# Patient Record
Sex: MALE | Race: WHITE | NOT HISPANIC OR LATINO | Employment: OTHER | URBAN - METROPOLITAN AREA
[De-identification: names, ages, dates, MRNs, and addresses within clinical notes are randomized per-mention and may not be internally consistent; named-entity substitution may affect disease eponyms.]

---

## 2024-06-08 ENCOUNTER — ANESTHESIA (OUTPATIENT)
Dept: SURGERY | Facility: CLINIC | Age: 74
End: 2024-06-08
Payer: COMMERCIAL

## 2024-06-08 ENCOUNTER — HOSPITAL ENCOUNTER (OUTPATIENT)
Facility: CLINIC | Age: 74
Discharge: HOME | End: 2024-06-09
Attending: EMERGENCY MEDICINE | Admitting: UROLOGY
Payer: COMMERCIAL

## 2024-06-08 ENCOUNTER — APPOINTMENT (OUTPATIENT)
Dept: GENERAL RADIOLOGY | Facility: CLINIC | Age: 74
End: 2024-06-08
Attending: UROLOGY
Payer: COMMERCIAL

## 2024-06-08 ENCOUNTER — ANESTHESIA EVENT (OUTPATIENT)
Dept: SURGERY | Facility: CLINIC | Age: 74
End: 2024-06-08
Payer: COMMERCIAL

## 2024-06-08 DIAGNOSIS — R33.9 URINARY RETENTION: ICD-10-CM

## 2024-06-08 DIAGNOSIS — N13.9 URINARY OBSTRUCTION: ICD-10-CM

## 2024-06-08 LAB
ANION GAP SERPL CALCULATED.3IONS-SCNC: 16 MMOL/L (ref 7–15)
BASOPHILS # BLD AUTO: 0.1 10E3/UL (ref 0–0.2)
BASOPHILS NFR BLD AUTO: 1 %
BUN SERPL-MCNC: 30.5 MG/DL (ref 8–23)
CALCIUM SERPL-MCNC: 9.6 MG/DL (ref 8.8–10.2)
CHLORIDE SERPL-SCNC: 105 MMOL/L (ref 98–107)
CREAT SERPL-MCNC: 1.06 MG/DL (ref 0.67–1.17)
DEPRECATED HCO3 PLAS-SCNC: 17 MMOL/L (ref 22–29)
EGFRCR SERPLBLD CKD-EPI 2021: 73 ML/MIN/1.73M2
EOSINOPHIL # BLD AUTO: 0.2 10E3/UL (ref 0–0.7)
EOSINOPHIL NFR BLD AUTO: 2 %
ERYTHROCYTE [DISTWIDTH] IN BLOOD BY AUTOMATED COUNT: 13.2 % (ref 10–15)
GLUCOSE BLDC GLUCOMTR-MCNC: 209 MG/DL (ref 70–99)
GLUCOSE SERPL-MCNC: 309 MG/DL (ref 70–99)
HCT VFR BLD AUTO: 37.1 % (ref 40–53)
HGB BLD-MCNC: 12.5 G/DL (ref 13.3–17.7)
HOLD SPECIMEN: NORMAL
HOLD SPECIMEN: NORMAL
IMM GRANULOCYTES # BLD: 0.1 10E3/UL
IMM GRANULOCYTES NFR BLD: 1 %
LYMPHOCYTES # BLD AUTO: 1.9 10E3/UL (ref 0.8–5.3)
LYMPHOCYTES NFR BLD AUTO: 19 %
MCH RBC QN AUTO: 29.7 PG (ref 26.5–33)
MCHC RBC AUTO-ENTMCNC: 33.7 G/DL (ref 31.5–36.5)
MCV RBC AUTO: 88 FL (ref 78–100)
MONOCYTES # BLD AUTO: 0.5 10E3/UL (ref 0–1.3)
MONOCYTES NFR BLD AUTO: 5 %
NEUTROPHILS # BLD AUTO: 7.5 10E3/UL (ref 1.6–8.3)
NEUTROPHILS NFR BLD AUTO: 72 %
NRBC # BLD AUTO: 0 10E3/UL
NRBC BLD AUTO-RTO: 0 /100
PLATELET # BLD AUTO: 285 10E3/UL (ref 150–450)
POTASSIUM SERPL-SCNC: 4.9 MMOL/L (ref 3.4–5.3)
RBC # BLD AUTO: 4.21 10E6/UL (ref 4.4–5.9)
SODIUM SERPL-SCNC: 138 MMOL/L (ref 135–145)
WBC # BLD AUTO: 10.3 10E3/UL (ref 4–11)

## 2024-06-08 PROCEDURE — 250N000011 HC RX IP 250 OP 636: Mod: JZ | Performed by: EMERGENCY MEDICINE

## 2024-06-08 PROCEDURE — 96374 THER/PROPH/DIAG INJ IV PUSH: CPT

## 2024-06-08 PROCEDURE — C1769 GUIDE WIRE: HCPCS | Performed by: UROLOGY

## 2024-06-08 PROCEDURE — 370N000017 HC ANESTHESIA TECHNICAL FEE, PER MIN: Performed by: UROLOGY

## 2024-06-08 PROCEDURE — 96361 HYDRATE IV INFUSION ADD-ON: CPT | Mod: 59

## 2024-06-08 PROCEDURE — 52000 CYSTOURETHROSCOPY: CPT | Mod: GC | Performed by: UROLOGY

## 2024-06-08 PROCEDURE — C1758 CATHETER, URETERAL: HCPCS | Performed by: UROLOGY

## 2024-06-08 PROCEDURE — 999N000180 XR SURGERY CARM FLUORO LESS THAN 5 MIN: Mod: TC

## 2024-06-08 PROCEDURE — 99285 EMERGENCY DEPT VISIT HI MDM: CPT | Mod: 25

## 2024-06-08 PROCEDURE — 250N000009 HC RX 250: Performed by: EMERGENCY MEDICINE

## 2024-06-08 PROCEDURE — 36415 COLL VENOUS BLD VENIPUNCTURE: CPT | Performed by: EMERGENCY MEDICINE

## 2024-06-08 PROCEDURE — 85049 AUTOMATED PLATELET COUNT: CPT | Performed by: EMERGENCY MEDICINE

## 2024-06-08 PROCEDURE — 250N000009 HC RX 250: Performed by: NURSE ANESTHETIST, CERTIFIED REGISTERED

## 2024-06-08 PROCEDURE — 258N000003 HC RX IP 258 OP 636: Mod: JZ | Performed by: ANESTHESIOLOGY

## 2024-06-08 PROCEDURE — 710N000009 HC RECOVERY PHASE 1, LEVEL 1, PER MIN: Performed by: UROLOGY

## 2024-06-08 PROCEDURE — 258N000003 HC RX IP 258 OP 636: Mod: JZ | Performed by: EMERGENCY MEDICINE

## 2024-06-08 PROCEDURE — 255N000002 HC RX 255 OP 636: Mod: JZ | Performed by: UROLOGY

## 2024-06-08 PROCEDURE — 250N000025 HC SEVOFLURANE, PER MIN: Performed by: UROLOGY

## 2024-06-08 PROCEDURE — 74430 CONTRAST X-RAY BLADDER: CPT | Mod: 26 | Performed by: UROLOGY

## 2024-06-08 PROCEDURE — 272N000001 HC OR GENERAL SUPPLY STERILE: Performed by: UROLOGY

## 2024-06-08 PROCEDURE — 360N000075 HC SURGERY LEVEL 2, PER MIN: Performed by: UROLOGY

## 2024-06-08 PROCEDURE — 710N000012 HC RECOVERY PHASE 2, PER MINUTE: Performed by: UROLOGY

## 2024-06-08 PROCEDURE — 80048 BASIC METABOLIC PNL TOTAL CA: CPT | Performed by: EMERGENCY MEDICINE

## 2024-06-08 PROCEDURE — 250N000011 HC RX IP 250 OP 636: Mod: JZ | Performed by: NURSE ANESTHETIST, CERTIFIED REGISTERED

## 2024-06-08 PROCEDURE — 250N000009 HC RX 250: Performed by: UROLOGY

## 2024-06-08 PROCEDURE — 51798 US URINE CAPACITY MEASURE: CPT

## 2024-06-08 PROCEDURE — 999N000141 HC STATISTIC PRE-PROCEDURE NURSING ASSESSMENT: Performed by: UROLOGY

## 2024-06-08 PROCEDURE — 250N000011 HC RX IP 250 OP 636: Performed by: STUDENT IN AN ORGANIZED HEALTH CARE EDUCATION/TRAINING PROGRAM

## 2024-06-08 RX ORDER — HYDROMORPHONE HCL IN WATER/PF 6 MG/30 ML
0.4 PATIENT CONTROLLED ANALGESIA SYRINGE INTRAVENOUS EVERY 5 MIN PRN
Status: DISCONTINUED | OUTPATIENT
Start: 2024-06-08 | End: 2024-06-08 | Stop reason: HOSPADM

## 2024-06-08 RX ORDER — CEFAZOLIN SODIUM/WATER 2 G/20 ML
2 SYRINGE (ML) INTRAVENOUS SEE ADMIN INSTRUCTIONS
Status: DISCONTINUED | OUTPATIENT
Start: 2024-06-08 | End: 2024-06-08 | Stop reason: HOSPADM

## 2024-06-08 RX ORDER — ATORVASTATIN CALCIUM 40 MG/1
40 TABLET, FILM COATED ORAL DAILY
COMMUNITY

## 2024-06-08 RX ORDER — ONDANSETRON 2 MG/ML
INJECTION INTRAMUSCULAR; INTRAVENOUS PRN
Status: DISCONTINUED | OUTPATIENT
Start: 2024-06-08 | End: 2024-06-08

## 2024-06-08 RX ORDER — LIDOCAINE HYDROCHLORIDE 20 MG/ML
6 JELLY TOPICAL ONCE
Status: DISCONTINUED | OUTPATIENT
Start: 2024-06-08 | End: 2024-06-09 | Stop reason: HOSPADM

## 2024-06-08 RX ORDER — HYDROMORPHONE HYDROCHLORIDE 1 MG/ML
1 INJECTION, SOLUTION INTRAMUSCULAR; INTRAVENOUS; SUBCUTANEOUS ONCE
Status: COMPLETED | OUTPATIENT
Start: 2024-06-08 | End: 2024-06-08

## 2024-06-08 RX ORDER — DIPHENHYDRAMINE HCL 12.5MG/5ML
12.5 LIQUID (ML) ORAL EVERY 6 HOURS PRN
Status: DISCONTINUED | OUTPATIENT
Start: 2024-06-08 | End: 2024-06-08 | Stop reason: HOSPADM

## 2024-06-08 RX ORDER — PROPOFOL 10 MG/ML
INJECTION, EMULSION INTRAVENOUS PRN
Status: DISCONTINUED | OUTPATIENT
Start: 2024-06-08 | End: 2024-06-08

## 2024-06-08 RX ORDER — LABETALOL HYDROCHLORIDE 5 MG/ML
10 INJECTION, SOLUTION INTRAVENOUS
Status: DISCONTINUED | OUTPATIENT
Start: 2024-06-08 | End: 2024-06-08 | Stop reason: HOSPADM

## 2024-06-08 RX ORDER — GLYCOPYRROLATE 0.2 MG/ML
INJECTION, SOLUTION INTRAMUSCULAR; INTRAVENOUS PRN
Status: DISCONTINUED | OUTPATIENT
Start: 2024-06-08 | End: 2024-06-08

## 2024-06-08 RX ORDER — ACETAMINOPHEN 325 MG/1
975 TABLET ORAL ONCE
Status: DISCONTINUED | OUTPATIENT
Start: 2024-06-08 | End: 2024-06-09 | Stop reason: HOSPADM

## 2024-06-08 RX ORDER — LIDOCAINE HYDROCHLORIDE 20 MG/ML
JELLY TOPICAL
Status: COMPLETED | OUTPATIENT
Start: 2024-06-08 | End: 2024-06-08

## 2024-06-08 RX ORDER — FENTANYL CITRATE 50 UG/ML
50 INJECTION, SOLUTION INTRAMUSCULAR; INTRAVENOUS EVERY 5 MIN PRN
Status: DISCONTINUED | OUTPATIENT
Start: 2024-06-08 | End: 2024-06-08 | Stop reason: HOSPADM

## 2024-06-08 RX ORDER — MULTIVIT WITH MINERALS/LUTEIN
1 TABLET ORAL DAILY
COMMUNITY

## 2024-06-08 RX ORDER — ATENOLOL 25 MG/1
25 TABLET ORAL DAILY
COMMUNITY

## 2024-06-08 RX ORDER — FENTANYL CITRATE 50 UG/ML
INJECTION, SOLUTION INTRAMUSCULAR; INTRAVENOUS PRN
Status: DISCONTINUED | OUTPATIENT
Start: 2024-06-08 | End: 2024-06-08

## 2024-06-08 RX ORDER — DEXAMETHASONE SODIUM PHOSPHATE 4 MG/ML
INJECTION, SOLUTION INTRA-ARTICULAR; INTRALESIONAL; INTRAMUSCULAR; INTRAVENOUS; SOFT TISSUE PRN
Status: DISCONTINUED | OUTPATIENT
Start: 2024-06-08 | End: 2024-06-08

## 2024-06-08 RX ORDER — DEXAMETHASONE SODIUM PHOSPHATE 4 MG/ML
4 INJECTION, SOLUTION INTRA-ARTICULAR; INTRALESIONAL; INTRAMUSCULAR; INTRAVENOUS; SOFT TISSUE
Status: DISCONTINUED | OUTPATIENT
Start: 2024-06-08 | End: 2024-06-08 | Stop reason: HOSPADM

## 2024-06-08 RX ORDER — CEFAZOLIN SODIUM/WATER 2 G/20 ML
2 SYRINGE (ML) INTRAVENOUS
Status: COMPLETED | OUTPATIENT
Start: 2024-06-08 | End: 2024-06-08

## 2024-06-08 RX ORDER — NITROGLYCERIN 0.4 MG/1
0.4 TABLET SUBLINGUAL EVERY 5 MIN PRN
COMMUNITY

## 2024-06-08 RX ORDER — FENTANYL CITRATE 50 UG/ML
25 INJECTION, SOLUTION INTRAMUSCULAR; INTRAVENOUS EVERY 5 MIN PRN
Status: DISCONTINUED | OUTPATIENT
Start: 2024-06-08 | End: 2024-06-08 | Stop reason: HOSPADM

## 2024-06-08 RX ORDER — TAMSULOSIN HYDROCHLORIDE 0.4 MG/1
0.4 CAPSULE ORAL DAILY
COMMUNITY

## 2024-06-08 RX ORDER — SODIUM CHLORIDE, SODIUM LACTATE, POTASSIUM CHLORIDE, CALCIUM CHLORIDE 600; 310; 30; 20 MG/100ML; MG/100ML; MG/100ML; MG/100ML
INJECTION, SOLUTION INTRAVENOUS CONTINUOUS
Status: DISCONTINUED | OUTPATIENT
Start: 2024-06-08 | End: 2024-06-08 | Stop reason: HOSPADM

## 2024-06-08 RX ORDER — METFORMIN HCL 500 MG
2000 TABLET, EXTENDED RELEASE 24 HR ORAL DAILY
COMMUNITY

## 2024-06-08 RX ORDER — KETOROLAC TROMETHAMINE 30 MG/ML
INJECTION, SOLUTION INTRAMUSCULAR; INTRAVENOUS PRN
Status: DISCONTINUED | OUTPATIENT
Start: 2024-06-08 | End: 2024-06-08

## 2024-06-08 RX ORDER — LOSARTAN POTASSIUM 100 MG/1
100 TABLET ORAL DAILY
COMMUNITY

## 2024-06-08 RX ORDER — ASPIRIN 325 MG
325 TABLET, DELAYED RELEASE (ENTERIC COATED) ORAL DAILY
COMMUNITY

## 2024-06-08 RX ORDER — HYDROMORPHONE HCL IN WATER/PF 6 MG/30 ML
0.2 PATIENT CONTROLLED ANALGESIA SYRINGE INTRAVENOUS EVERY 5 MIN PRN
Status: DISCONTINUED | OUTPATIENT
Start: 2024-06-08 | End: 2024-06-08 | Stop reason: HOSPADM

## 2024-06-08 RX ORDER — ONDANSETRON 2 MG/ML
4 INJECTION INTRAMUSCULAR; INTRAVENOUS EVERY 30 MIN PRN
Status: DISCONTINUED | OUTPATIENT
Start: 2024-06-08 | End: 2024-06-08 | Stop reason: HOSPADM

## 2024-06-08 RX ORDER — DIAZEPAM 10 MG/2ML
2.5 INJECTION, SOLUTION INTRAMUSCULAR; INTRAVENOUS
Status: DISCONTINUED | OUTPATIENT
Start: 2024-06-08 | End: 2024-06-08 | Stop reason: HOSPADM

## 2024-06-08 RX ORDER — LIDOCAINE 40 MG/G
CREAM TOPICAL
Status: DISCONTINUED | OUTPATIENT
Start: 2024-06-08 | End: 2024-06-09 | Stop reason: HOSPADM

## 2024-06-08 RX ORDER — LIDOCAINE HYDROCHLORIDE 20 MG/ML
INJECTION, SOLUTION INFILTRATION; PERINEURAL PRN
Status: DISCONTINUED | OUTPATIENT
Start: 2024-06-08 | End: 2024-06-08

## 2024-06-08 RX ORDER — ALBUTEROL SULFATE 0.83 MG/ML
2.5 SOLUTION RESPIRATORY (INHALATION) EVERY 4 HOURS PRN
Status: DISCONTINUED | OUTPATIENT
Start: 2024-06-08 | End: 2024-06-08 | Stop reason: HOSPADM

## 2024-06-08 RX ORDER — HYDRALAZINE HYDROCHLORIDE 20 MG/ML
2.5-5 INJECTION INTRAMUSCULAR; INTRAVENOUS EVERY 10 MIN PRN
Status: DISCONTINUED | OUTPATIENT
Start: 2024-06-08 | End: 2024-06-08 | Stop reason: HOSPADM

## 2024-06-08 RX ORDER — ONDANSETRON 4 MG/1
4 TABLET, ORALLY DISINTEGRATING ORAL EVERY 30 MIN PRN
Status: DISCONTINUED | OUTPATIENT
Start: 2024-06-08 | End: 2024-06-08 | Stop reason: HOSPADM

## 2024-06-08 RX ORDER — DIPHENHYDRAMINE HYDROCHLORIDE 50 MG/ML
12.5 INJECTION INTRAMUSCULAR; INTRAVENOUS EVERY 6 HOURS PRN
Status: DISCONTINUED | OUTPATIENT
Start: 2024-06-08 | End: 2024-06-08 | Stop reason: HOSPADM

## 2024-06-08 RX ORDER — GLIPIZIDE 10 MG/1
10 TABLET, FILM COATED, EXTENDED RELEASE ORAL 2 TIMES DAILY
COMMUNITY

## 2024-06-08 RX ORDER — NALOXONE HYDROCHLORIDE 0.4 MG/ML
0.1 INJECTION, SOLUTION INTRAMUSCULAR; INTRAVENOUS; SUBCUTANEOUS
Status: DISCONTINUED | OUTPATIENT
Start: 2024-06-08 | End: 2024-06-08 | Stop reason: HOSPADM

## 2024-06-08 RX ADMIN — SODIUM CHLORIDE, POTASSIUM CHLORIDE, SODIUM LACTATE AND CALCIUM CHLORIDE: 600; 310; 30; 20 INJECTION, SOLUTION INTRAVENOUS at 21:47

## 2024-06-08 RX ADMIN — PROPOFOL 200 MG: 10 INJECTION, EMULSION INTRAVENOUS at 21:55

## 2024-06-08 RX ADMIN — KETOROLAC TROMETHAMINE 15 MG: 30 INJECTION, SOLUTION INTRAMUSCULAR at 21:55

## 2024-06-08 RX ADMIN — LIDOCAINE HYDROCHLORIDE 50 MG: 20 INJECTION, SOLUTION INFILTRATION; PERINEURAL at 21:55

## 2024-06-08 RX ADMIN — FENTANYL CITRATE 100 MCG: 50 INJECTION INTRAMUSCULAR; INTRAVENOUS at 21:55

## 2024-06-08 RX ADMIN — DEXAMETHASONE SODIUM PHOSPHATE 8 MG: 4 INJECTION, SOLUTION INTRA-ARTICULAR; INTRALESIONAL; INTRAMUSCULAR; INTRAVENOUS; SOFT TISSUE at 21:55

## 2024-06-08 RX ADMIN — SODIUM CHLORIDE 1000 ML: 9 INJECTION, SOLUTION INTRAVENOUS at 18:21

## 2024-06-08 RX ADMIN — GLYCOPYRROLATE 0.2 MG: 0.2 INJECTION, SOLUTION INTRAMUSCULAR; INTRAVENOUS at 21:55

## 2024-06-08 RX ADMIN — Medication 2 G: at 21:52

## 2024-06-08 RX ADMIN — ONDANSETRON 4 MG: 2 INJECTION INTRAMUSCULAR; INTRAVENOUS at 21:55

## 2024-06-08 RX ADMIN — LIDOCAINE HYDROCHLORIDE: 20 JELLY TOPICAL at 18:35

## 2024-06-08 RX ADMIN — HYDROMORPHONE HYDROCHLORIDE 1 MG: 1 INJECTION, SOLUTION INTRAMUSCULAR; INTRAVENOUS; SUBCUTANEOUS at 18:59

## 2024-06-08 ASSESSMENT — ACTIVITIES OF DAILY LIVING (ADL)
ADLS_ACUITY_SCORE: 35
ADLS_ACUITY_SCORE: 33
ADLS_ACUITY_SCORE: 35

## 2024-06-08 ASSESSMENT — COLUMBIA-SUICIDE SEVERITY RATING SCALE - C-SSRS
6. HAVE YOU EVER DONE ANYTHING, STARTED TO DO ANYTHING, OR PREPARED TO DO ANYTHING TO END YOUR LIFE?: NO
2. HAVE YOU ACTUALLY HAD ANY THOUGHTS OF KILLING YOURSELF IN THE PAST MONTH?: NO
1. IN THE PAST MONTH, HAVE YOU WISHED YOU WERE DEAD OR WISHED YOU COULD GO TO SLEEP AND NOT WAKE UP?: NO

## 2024-06-08 NOTE — ED PROVIDER NOTES
"  Emergency Department Note      History of Present Illness     Chief Complaint  Post-op Problem    HPI  Lucius Anders is a 74 year old male with a history of hypertension and type 2 diabetes who reports to the ED for evaluation of post-op problem. The patient reports at 1000 today inability to produce an urine output. Endorses worsening dysuria and hematuria. Wife states he had a bladder stone removed Monday by Dr. Shetty. Shares a catheter was placed for surgery and removed after. States patient has been taking Bactrim since procedure.       Independent Historian  Wife as detailed above.    Review of External Notes   urology op note- bladder stone removed  Past Medical History   Medical History and Problem List  Hyperlipidemia   Otitis externa   Coronary artery disease   Type 2 diabetes   Ischemic cardiomyopathy   Hypertension     Medications  Norco   Nitrostat   Glucotrol   Cozaar   Tenormin   Lipitor   Metformin   Aspirin 325 MG     Surgical History   PTCA   Cataract removal   Physical Exam   Patient Vitals for the past 24 hrs:   BP Temp Pulse Resp SpO2 Height Weight   06/08/24 1900 (!) 229/126 -- 89 -- 97 % -- --   06/08/24 1800 (!) 241/179 -- 113 -- 98 % -- --   06/08/24 1732 (!) 178/109 -- 94 -- 98 % -- --   06/08/24 1725 (!) 184/110 -- 93 -- 96 % -- --   06/08/24 1633 (!) 192/100 97.6  F (36.4  C) 99 16 97 % 1.778 m (5' 10\") 108.9 kg (240 lb)     Physical Exam  Constitutional:       Appearance: He is well-developed. He is diaphoretic.   HENT:      Mouth/Throat:      Mouth: Mucous membranes are moist.      Pharynx: Oropharynx is clear. No oropharyngeal exudate.   Eyes:      General: No scleral icterus.     Conjunctiva/sclera: Conjunctivae normal.      Pupils: Pupils are equal, round, and reactive to light.   Cardiovascular:      Rate and Rhythm: Normal rate and regular rhythm.      Heart sounds: Normal heart sounds. No murmur heard.     No friction rub. No gallop.   Pulmonary:      Effort: Pulmonary " effort is normal. No respiratory distress.      Breath sounds: Normal breath sounds. No wheezing or rales.   Abdominal:      General: Bowel sounds are normal. There is distension.      Palpations: Abdomen is soft. There is no mass.      Tenderness: There is abdominal tenderness.      Comments: Suprapubic tenderness and distention noted   Musculoskeletal:      Cervical back: Neck supple.   Skin:     General: Skin is warm.      Capillary Refill: Capillary refill takes less than 2 seconds.      Findings: No rash.   Neurological:      Mental Status: He is alert.         Diagnostics   Lab Results   Labs Ordered and Resulted from Time of ED Arrival to Time of ED Departure   BASIC METABOLIC PANEL - Abnormal       Result Value    Sodium 138      Potassium 4.9      Chloride 105      Carbon Dioxide (CO2) 17 (*)     Anion Gap 16 (*)     Urea Nitrogen 30.5 (*)     Creatinine 1.06      GFR Estimate 73      Calcium 9.6      Glucose 309 (*)    CBC WITH PLATELETS AND DIFFERENTIAL - Abnormal    WBC Count 10.3      RBC Count 4.21 (*)     Hemoglobin 12.5 (*)     Hematocrit 37.1 (*)     MCV 88      MCH 29.7      MCHC 33.7      RDW 13.2      Platelet Count 285      % Neutrophils 72      % Lymphocytes 19      % Monocytes 5      % Eosinophils 2      % Basophils 1      % Immature Granulocytes 1      NRBCs per 100 WBC 0      Absolute Neutrophils 7.5      Absolute Lymphocytes 1.9      Absolute Monocytes 0.5      Absolute Eosinophils 0.2      Absolute Basophils 0.1      Absolute Immature Granulocytes 0.1      Absolute NRBCs 0.0     ROUTINE UA WITH MICROSCOPIC REFLEX TO CULTURE       Imaging  No orders to display     Independent Interpretation  None  ED Course    Medications Administered  Medications   lidocaine (XYLOCAINE) 2 % external gel 6 mL (has no administration in time range)   sodium chloride 0.9% BOLUS 1,000 mL (0 mLs Intravenous Stopped 6/8/24 1941)   lidocaine (XYLOCAINE) 2 % external gel ( Urethral $Given 6/8/24 8175)    HYDROmorphone (PF) (DILAUDID) injection 1 mg (1 mg Intravenous $Given 6/8/24 1851)       Procedures  Procedures     Discussion of Management  Urology, Dr. Danii Cervantes.     Social Determinants of Health adding to complexity of care  None    ED Course  ED Course as of 06/08/24 2036   Sat Jun 08, 2024   1713 I obtained history and examined the patient as noted above.    1904 I consulted urology, Dr. Danii Cervantes   2035 I discussed the patient with Urology. Urology reports unable to place catheter.      Medical Decision Making / Diagnosis   CMS Diagnoses: None    MIPS     None    MDM  Lucius Anders is a 74 year old male who presents with urinary retention and hematuria after a lithotripsy procedure for bladder stone.  He is quite distended and had over 600 cc of urine in his bladder on her scan.  Nursing staff attempted to place a catheter to do irrigation.  They were unsuccessful in their attempts.  I contacted urology given the inability to place Schultz.  Patient was seen and evaluated in the ER by urology.  They were unable to place a catheter as well.  Patient will be going to the operating room for further evaluation and Schultz placement and bladder irrigation.  Patient kept n.p.o. and the duration of his stay.    Disposition  Patient transferred to the OR    ICD-10 Codes:    ICD-10-CM    1. Urinary retention  R33.9       2. Urinary obstruction  N13.9                Scribe Disclosure:  I, Bryanna Grover, am serving as a scribe at 5:02 PM on 6/8/2024 to document services personally performed by Jesusita Morin MD based on my observations and the provider's statements to me.        Jesusita Morin MD  06/08/24 3261

## 2024-06-08 NOTE — ED TRIAGE NOTES
Pt presents for complaint of urinary difficulty. Pt states he had a bladder stone removed via laser procedure on Monday at [facility] [place], [place]. States blood in his urine since. Today describes in ability to urinate and pain. Describes small clots in his urine. ABC intact, A&Ox4.     Triage Assessment (Adult)       Row Name 06/08/24 8848          Triage Assessment    Airway WDL WDL        Respiratory WDL    Respiratory WDL WDL        Skin Circulation/Temperature WDL    Skin Circulation/Temperature WDL WDL        Cardiac WDL    Cardiac WDL WDL        Peripheral/Neurovascular WDL    Peripheral Neurovascular WDL WDL        Cognitive/Neuro/Behavioral WDL    Cognitive/Neuro/Behavioral WDL WDL

## 2024-06-09 ENCOUNTER — HOSPITAL ENCOUNTER (OUTPATIENT)
Facility: CLINIC | Age: 74
Discharge: HOME | End: 2024-06-09
Attending: STUDENT IN AN ORGANIZED HEALTH CARE EDUCATION/TRAINING PROGRAM | Admitting: STUDENT IN AN ORGANIZED HEALTH CARE EDUCATION/TRAINING PROGRAM
Payer: COMMERCIAL

## 2024-06-09 VITALS
RESPIRATION RATE: 14 BRPM | TEMPERATURE: 96.9 F | OXYGEN SATURATION: 99 % | WEIGHT: 240 LBS | DIASTOLIC BLOOD PRESSURE: 106 MMHG | BODY MASS INDEX: 34.36 KG/M2 | HEIGHT: 70 IN | HEART RATE: 83 BPM | SYSTOLIC BLOOD PRESSURE: 165 MMHG

## 2024-06-09 VITALS
TEMPERATURE: 97.7 F | OXYGEN SATURATION: 95 % | BODY MASS INDEX: 34.37 KG/M2 | RESPIRATION RATE: 20 BRPM | DIASTOLIC BLOOD PRESSURE: 74 MMHG | SYSTOLIC BLOOD PRESSURE: 134 MMHG | HEART RATE: 78 BPM | WEIGHT: 240.08 LBS | HEIGHT: 70 IN

## 2024-06-09 DIAGNOSIS — R82.71 BACTERIURIA: ICD-10-CM

## 2024-06-09 DIAGNOSIS — R33.9 URINARY RETENTION: ICD-10-CM

## 2024-06-09 LAB
ALBUMIN UR-MCNC: 50 MG/DL
APPEARANCE UR: ABNORMAL
BILIRUB UR QL STRIP: NEGATIVE
COLOR UR AUTO: ABNORMAL
GLUCOSE BLDC GLUCOMTR-MCNC: 182 MG/DL (ref 70–99)
GLUCOSE UR STRIP-MCNC: NEGATIVE MG/DL
HGB UR QL STRIP: ABNORMAL
KETONES UR STRIP-MCNC: NEGATIVE MG/DL
LEUKOCYTE ESTERASE UR QL STRIP: ABNORMAL
NITRATE UR QL: NEGATIVE
PH UR STRIP: 5.5 [PH] (ref 5–7)
RBC URINE: >182 /HPF
SP GR UR STRIP: 1.01 (ref 1–1.03)
UROBILINOGEN UR STRIP-MCNC: NORMAL MG/DL
WBC URINE: 38 /HPF

## 2024-06-09 PROCEDURE — 99283 EMERGENCY DEPT VISIT LOW MDM: CPT | Mod: 25,27

## 2024-06-09 PROCEDURE — 87086 URINE CULTURE/COLONY COUNT: CPT | Performed by: STUDENT IN AN ORGANIZED HEALTH CARE EDUCATION/TRAINING PROGRAM

## 2024-06-09 PROCEDURE — 51702 INSERT TEMP BLADDER CATH: CPT

## 2024-06-09 PROCEDURE — 81001 URINALYSIS AUTO W/SCOPE: CPT | Performed by: STUDENT IN AN ORGANIZED HEALTH CARE EDUCATION/TRAINING PROGRAM

## 2024-06-09 PROCEDURE — 250N000013 HC RX MED GY IP 250 OP 250 PS 637: Performed by: STUDENT IN AN ORGANIZED HEALTH CARE EDUCATION/TRAINING PROGRAM

## 2024-06-09 RX ORDER — CEFUROXIME AXETIL 250 MG/1
500 TABLET ORAL ONCE
Status: COMPLETED | OUTPATIENT
Start: 2024-06-09 | End: 2024-06-09

## 2024-06-09 RX ORDER — CEFUROXIME AXETIL 500 MG/1
500 TABLET ORAL 2 TIMES DAILY
Qty: 14 TABLET | Refills: 0 | Status: SHIPPED | OUTPATIENT
Start: 2024-06-09 | End: 2024-06-16

## 2024-06-09 RX ADMIN — CEFUROXIME AXETIL 500 MG: 250 TABLET, FILM COATED ORAL at 20:53

## 2024-06-09 ASSESSMENT — ACTIVITIES OF DAILY LIVING (ADL)
ADLS_ACUITY_SCORE: 35
ADLS_ACUITY_SCORE: 35
ADLS_ACUITY_SCORE: 33

## 2024-06-09 ASSESSMENT — COLUMBIA-SUICIDE SEVERITY RATING SCALE - C-SSRS
2. HAVE YOU ACTUALLY HAD ANY THOUGHTS OF KILLING YOURSELF IN THE PAST MONTH?: NO
1. IN THE PAST MONTH, HAVE YOU WISHED YOU WERE DEAD OR WISHED YOU COULD GO TO SLEEP AND NOT WAKE UP?: NO
6. HAVE YOU EVER DONE ANYTHING, STARTED TO DO ANYTHING, OR PREPARED TO DO ANYTHING TO END YOUR LIFE?: NO

## 2024-06-09 NOTE — CONSULTS
Urology Consult    Lucius Anders is a 74 year old pt with history of cystolithalopaxy 6/3 (Dr Shetty) who presents to ED with urinary retention. He last urinated last night and it was pink. He passed some clots.    In the ED, attempt was made at 3 way barrios placement resulting in blood at the meatus. Urology was then contacted.    I evaluated the patient and he verbally consented for bedside cystoscopy. The urethral meatus was prepped and draped in standard fashion. Urojet was instilled. A flexible cystoscope was then inserted and immediately a large clot and false passage was seen. I made several attempts to pass a sensor wire through the false passage but given the angle of the false passage/penoscrotal anatomy and tissue edema this was unsuccessful.     Given failed bedside attempt I counseled patient on bedside suprapubic cystotomy tube placement vs catheter placement in the OR. He takes full strength aspirin and we do not have cross sectional imaging to review, so the bedside procedure is less optimal for him. Therefore decided to proceed with OR cystoscopy, catheter placement, possible suprapubic tube placement.    Patient and his wife were counseled on risks of OR barrios placement including bleeding, infection, damage to surrounding structures. He is NPO since lunch time.    - emergently to OR for bladder decompression  - consent to be obtained by Dr Dumont  - preop abx ordered  - likely will discharge from PACU, follow up with Dr Shetty for voiding trial and discussion of next steps    Danii Cervantes MD  [id number]

## 2024-06-09 NOTE — ED NOTES
RN alerted MD that attempts at barrios insertion were unsuccessful. Attempts were made with size 22 Albanian and 20 Albanian 3-way barrios. During insertion, the catheter was met with lots of resistance. Blood noted in catheter. Urojet was used.

## 2024-06-09 NOTE — PROGRESS NOTES
Discharge instructions almost complete when RN noticed that Barrios was not draining. Irrigated same and multiple small clots were drawn back out in syringe. Waited to see if same would drain and tried irrigating again with difficulty. Called Dr Dumont who said his resident would come take a look. Soon after barrios started draining well. Called Dr Dumont back to inform him that barrios was still continuing to drain well and he still wanted his resident to check patient before discharging.    Seen by resident who said ok for patient to discharge. Output now copious and clearing.    BP slightly elevated but better than before surgery. Pt stated he will be taking oral antihypertensives in the next couple of hours and he just wanted to go home

## 2024-06-09 NOTE — ANESTHESIA PROCEDURE NOTES
Airway         Procedure Start/Stop Times: 6/8/2024 9:57 AM  Staff -        CRNA: Bart Holm APRN CRNA       Performed By: CRNAIndications and Patient Condition       Indications for airway management: airway protection       Induction type:intravenous       Mask difficulty assessment: 0 - not attempted    Final Airway Details       Final airway type: supraglottic airway    Supraglottic Airway Details        Type: LMA       Brand: I-Gel       LMA size: 5    Post intubation assessment        Placement verified by: capnometry, equal breath sounds and chest rise        Number of attempts at approach: 1       Secured with: commercial tube white       Ease of procedure: easy       Dentition: Intact    Medication(s) Administered   Medication Administration Time: 6/8/2024 9:57 AM

## 2024-06-09 NOTE — ANESTHESIA PREPROCEDURE EVALUATION
Anesthesia Pre-Procedure Evaluation    Patient: Lucius Anders   MRN: [id number] : 1950        Procedure : Procedure(s):  Cystoscopy, barrios catheter placement, possible suprapubic catheter placement          No past medical history on file.   No past surgical history on file.   No Known Allergies   Social History     Tobacco Use    Smoking status: Not on file    Smokeless tobacco: Not on file   Substance Use Topics    Alcohol use: Not on file      Wt Readings from Last 1 Encounters:   24 108.9 kg (240 lb)        Anesthesia Evaluation   Pt has had prior anesthetic. Type: General, Regional and MAC.        ROS/MED HX  ENT/Pulmonary:  - neg pulmonary ROS     Neurologic: Comment: Hx of TBI      Cardiovascular: Comment: Ischemic cardiomyopathy    (+) Dyslipidemia hypertension- -  CAD -  - stent- 1 Drug Eluting Stent.                                    METS/Exercise Tolerance:     Hematologic:  - neg hematologic  ROS     Musculoskeletal:   (+)  arthritis,             GI/Hepatic:  - neg GI/hepatic ROS     Renal/Genitourinary:  - neg Renal ROS     Endo: Comment: Obesity class 1    (+)  type II DM,   Not using insulin, - not using insulin pump.         Obesity,       Psychiatric/Substance Use:  - neg psychiatric ROS     Infectious Disease:  - neg infectious disease ROS     Malignancy:  - neg malignancy ROS     Other:            Physical Exam    Airway        Mallampati: II   TM distance: > 3 FB   Neck ROM: full   Mouth opening: > 3 cm    Respiratory Devices and Support         Dental           Cardiovascular   cardiovascular exam normal       Rhythm and rate: regular and normal     Pulmonary   pulmonary exam normal        breath sounds clear to auscultation       Other findings: Lab Test        24                       1821          WBC          10.3          HGB          12.5*         MCV          88            PLT          285            Lab Test        24                       1821          NA      "      138           POTASSIUM    4.9           CHLORIDE     105           CO2          17*           BUN          30.5*         CR           1.06          ANIONGAP     16*           EVER          9.6           GLC          309*                 EKG Interpretation: none available    OUTSIDE LABS:  CBC:   Lab Results   Component Value Date    WBC 10.3 06/08/2024    HGB 12.5 (L) 06/08/2024    HCT 37.1 (L) 06/08/2024     06/08/2024     BMP:   Lab Results   Component Value Date     06/08/2024    POTASSIUM 4.9 06/08/2024    CHLORIDE 105 06/08/2024    CO2 17 (L) 06/08/2024    BUN 30.5 (H) 06/08/2024    CR 1.06 06/08/2024     (H) 06/08/2024     COAGS: No results found for: \"PTT\", \"INR\", \"FIBR\"  POC: No results found for: \"BGM\", \"HCG\", \"HCGS\"  HEPATIC: No results found for: \"ALBUMIN\", \"PROTTOTAL\", \"ALT\", \"AST\", \"GGT\", \"ALKPHOS\", \"BILITOTAL\", \"BILIDIRECT\", \"ELBERT\"  OTHER:   Lab Results   Component Value Date    EVER 9.6 06/08/2024       Anesthesia Plan    ASA Status:  3, emergent    NPO Status:  NPO Appropriate    Anesthesia Type: General.     - Airway: LMA   Induction: Intravenous.   Maintenance: Balanced.        Consents    Anesthesia Plan(s) and associated risks, benefits, and realistic alternatives discussed. Questions answered and patient/representative(s) expressed understanding.     - Discussed: Risks, Benefits and Alternatives for BOTH SEDATION and the PROCEDURE were discussed     - Discussed with:  Patient      - Extended Intubation/Ventilatory Support Discussed: No.      - Patient is DNR/DNI Status: No     Use of blood products discussed: No .     Postoperative Care    Pain management: IV analgesics, Oral pain medications, Multi-modal analgesia.   PONV prophylaxis: Ondansetron (or other 5HT-3), Dexamethasone or Solumedrol     Comments:               Darrel Peña MD    I have reviewed the pertinent notes and labs in the chart from the past 30 days and (re)examined the patient.  Any updates or " "changes from those notes are reflected in this note.              # Obesity: Estimated body mass index is 34.44 kg/m  as calculated from the following:    Height as of this encounter: 1.778 m (5' 10\").    Weight as of this encounter: 108.9 kg (240 lb).      "

## 2024-06-09 NOTE — OP NOTE
OPERATIVE REPORT 6/8/24    PREOPERATIVE DIAGNOSIS: urinary retention, urethral trauma    POSTOPERATIVE DIAGNOSIS:  Same    PROCEDURES PERFORMED:   1. Cystourethroscopy with complex barrios catheter placement  2. Intraoperative cystogram  3. Intraoperative interpretation of fluoroscopic imaging.     STAFF SURGEON: Javi Dumont MD    RESIDENT: Danii Cervantes MD    ANESTHESIA: General    ESTIMATED BLOOD LOSS: 0 mL.     DRAINS:  16 Fr [place] catheter, 10 in balloon      OPERATIVE INDICATIONS:   Lucius Anders is a 74 year old male who presented to ED this evening after not urinating all day, s/p cystolithalopaxy 6/3 at [facility]. Bedside attempt at barrios placement was unsuccessful. The patient was counseled on the alternatives, risks, and benefits and elected to proceed with the above stated procedure.    DESCRIPTION OF PROCEDURE:    After informed consent was obtained, the patient was taken to the operating room, and moved to the operating table.  After adequate anesthesia was induced, the patient was repositioned in dorsal lithotomy position and prepped and draped in the usual sterile fashion. A timeout was taken to confirm correct patient, procedure and laterality.     A 22-Syriac cystoscope was inserted into a well lubricated urethra. The anterior urethra was unremarkable. There was considerable scar seen in the bulbar urethra and a large false passage. A sensor wire was advanced via the most superior lumen but met resistance. We attempted to probe for the true lumen and eventually were able to pass the wire without resistance. Entry into the bladder was confirmed by aspiration with a 5 Fr open ended catheter.    The wire was replaced and a 16 Fr [place] catheter was advanced with some resistance into the bladder with drainage of urine.     A cystogram was performed given difficult placement and prologned retention using 120 ccs of diluted contrast. Bladder contour was normal although diverticulated,  no extravasation of contrast, and no clot seen.     This concluded our procedure. The barrios was connected to sterile drainage bag and secured to patients leg.    The patient tolerated the procedure well.  There were no complications.         PLAN:   - Discharge when meeting PACU criteria with barrios  - Follow up with urologist next week

## 2024-06-09 NOTE — ANESTHESIA POSTPROCEDURE EVALUATION
Patient: Lucius Anders    Procedure: Procedure(s):  Cystoscopy, barrios catheter placement,cystogram       Anesthesia Type:  General    Note:  Disposition: Inpatient   Postop Pain Control: Uneventful            Sign Out: Well controlled pain   PONV: No   Neuro/Psych: Uneventful            Sign Out: Acceptable/Baseline neuro status   Airway/Respiratory: Uneventful            Sign Out: Acceptable/Baseline resp. status   CV/Hemodynamics: Uneventful            Sign Out: Acceptable CV status; No obvious hypovolemia; No obvious fluid overload   Other NRE: NONE   DID A NON-ROUTINE EVENT OCCUR? No           Last vitals:  Vitals Value Taken Time   /98 06/08/24 2255   Temp 96.9  F (36.1  C) 06/08/24 2235   Pulse 89 06/08/24 2256   Resp 12 06/08/24 2256   SpO2 98 % 06/08/24 2256   Vitals shown include unfiled device data.    Electronically Signed By: Darrel Peña MD  June 8, 2024  10:58 PM

## 2024-06-09 NOTE — ANESTHESIA CARE TRANSFER NOTE
Patient: Lucius Anders    Procedure: Procedure(s):  Cystoscopy, barrios catheter placement,       Diagnosis: Unable to void [R33.9]  Diagnosis Additional Information: No value filed.    Anesthesia Type:   General     Note:    Oropharynx: oropharynx clear of all foreign objects and spontaneously breathing  Level of Consciousness: drowsy  Oxygen Supplementation: face mask  Level of Supplemental Oxygen (L/min / FiO2): 10  Independent Airway: airway patency satisfactory and stable  Dentition: dentition unchanged  Vital Signs Stable: post-procedure vital signs reviewed and stable  Report to RN Given: handoff report given  Patient transferred to: PACU    Handoff Report: Identifed the Patient, Identified the Reponsible Provider, Reviewed the pertinent medical history, Discussed the surgical course, Reviewed Intra-OP anesthesia mangement and issues during anesthesia, Set expectations for post-procedure period and Allowed opportunity for questions and acknowledgement of understanding      Vitals:  Vitals Value Taken Time   BP     Temp     Pulse     Resp     SpO2         Electronically Signed By: LETITIA Marcano CRNA  June 8, 2024  10:33 PM

## 2024-06-10 NOTE — ED PROVIDER NOTES
"  Emergency Department Note      History of Present Illness     Chief Complaint  Urinary Retention    HPI  Lucius Anders is a 74 year old male who presents being unable to pass any urine despite his Schultz catheter being in place since around 2 PM this afternoon.  Has become a lot more uncomfortable over the past couple of hours.  He says that it has continued to be bloody since leaving the hospital.  Has been taking all prescriptions as prescribed.  Denies fevers, chills, nausea, vomiting.    Independent Historian  None    Review of External Notes  Op note yesterday: Patient required cystoscopic Schultz catheter placement  Past Medical History   Medical History and Problem List  No past medical history on file.    Medications  cefuroxime (CEFTIN) 500 MG tablet  aspirin (ASA) 325 MG EC tablet  atenolol (TENORMIN) 25 MG tablet  atorvastatin (LIPITOR) 40 MG tablet  glipiZIDE (GLUCOTROL XL) 10 MG 24 hr tablet  losartan (COZAAR) 100 MG tablet  metFORMIN (GLUCOPHAGE XR) 500 MG 24 hr tablet  multivitamin (CENTRUM SILVER) tablet  nitroGLYcerin (NITROSTAT) 0.4 MG sublingual tablet  tamsulosin (FLOMAX) 0.4 MG capsule        Surgical History   No past surgical history on file.  Physical Exam   Patient Vitals for the past 24 hrs:   BP Temp Temp src Pulse Resp SpO2 Height Weight   06/09/24 2045 134/74 -- -- 78 20 95 % -- --   06/09/24 1909 (!) 197/112 97.7  F (36.5  C) Temporal 90 18 97 % 1.778 m (5' 10\") 108.9 kg (240 lb 1.3 oz)     Physical Exam  General:  Alert.  Hard of hearing elderly man.  In acute distress secondary to pain.  Cardiovascular:  Normal rate  Lungs:  No respiratory distress, no accessory muscle use  Abdominal: No distension.  Tender suprapubic fullness  : Schultz catheter in place in good position.  Scant bloody urine in leg bag.   Neuro:  Moving all 4 extremities  MSK: No gross deformities  Skin:  Warm, dry    Diagnostics   Lab Results   Labs Ordered and Resulted from Time of ED Arrival to Time of ED " Departure   ROUTINE UA WITH MICROSCOPIC REFLEX TO CULTURE - Abnormal       Result Value    Color Urine Brown (*)     Appearance Urine Slightly Cloudy (*)     Glucose Urine Negative      Bilirubin Urine Negative      Ketones Urine Negative      Specific Gravity Urine 1.013      Blood Urine Large (*)     pH Urine 5.5      Protein Albumin Urine 50 (*)     Urobilinogen Urine Normal      Nitrite Urine Negative      Leukocyte Esterase Urine Moderate (*)     RBC Urine >182 (*)     WBC Urine 38 (*)    URINE CULTURE       Independent Interpretation  None  ED Course    Medications Administered  Medications   cefuroxime (CEFTIN) tablet 500 mg (500 mg Oral $Given 6/9/24 2053)       Procedures  Procedures     Discussion of Management  Urology, discussed case.  Aware that Schultz is now draining.  Aftercare instructions discussed.    Social Determinants of Health adding to complexity of care  None    ED Course  ED Course as of 06/09/24 2339   [place] Jun 09, 202409, 2024 2024 Spoke with urology on-call     Medical Decision Making / Diagnosis   CMS Diagnoses: None    MIPS     None    [facility]  Lucius Anders is a 74 year old male who presents with acute urinary retention after complex Schultz placement in the OR under 24 hours ago.     Without moving the Schultz, balloon fluid was withdrawn and then reinstilled without difficulty to ensure balloon had not ruptured or lost fluid.  Still had approximately 10 cc of saline in it. With 50 cc of sterile saline and Schultz irrigation kit I was able to instill 25 cc and then withdraw back a 3 cm long serpiginous blood clot that seems to be at the very distal end of the Schultz catheter.  Thereafter, 650 cc of urine was drained easily into the Schultz bag.  The urine was initially quite dark.  After allowing most of the urine to drain from the bladder I flushed the bladder with 50 cc of sterile saline and withdrew back a light bo appearing urine.  Throughout the remainder of his stay, there was small flecks of  blood draining pink-tinged clear urine.     Spoke with urology who are okay with him discharging home.  They informed me they had offered to show the patient how to manually irrigate a clot should this happen at home.  Patient's wife confirmed but said at the time it was late at night and they just wanted to get home.  Patient is interested in learning how to manually irrigate any blood clots.  I provided instruction of how to do so and asked him not to perform more than 1 attempt with no more than 30 to 50 cc of sterile saline for irrigation.  Sent home with an irrigation kit and 1 L of sterile saline.  I asked him if he is unsuccessful the first time with irrigation and is having a lot of discomfort he needs to come back to the ER.    Urine does have some whites in it.  Will cover empirically with Ceftin 500 mg twice daily for 7 days and send culture.  Kidney function from just a few days ago was WNL.  No indication for repeat creatinine today.  He knows to return for urine output less than 250 mL in 4 hours, suprapubic distention, fevers.  Will likely continue to have hematuria for the next 1 to 2 weeks per urology.  Patient is informed of this.  Discharged home in improved condition.  All questions answered.    Disposition  The patient was discharged.     ICD-10 Codes:    ICD-10-CM    1. Urinary retention  R33.9       2. Bacteriuria  R82.71            Discharge Medications  Discharge Medication List as of 6/9/2024  9:02 PM        START taking these medications    Details   cefuroxime (CEFTIN) 500 MG tablet Take 1 tablet (500 mg) by mouth 2 times daily for 7 days, Disp-14 tablet, R-0, E-Prescribe               DO Brayan Rai Ellen, DO  06/09/24 7801

## 2024-06-10 NOTE — ED TRIAGE NOTES
Pt barrios stopped draining at 1400. Pt noticed hematuria since barrios insertion here. Increased penile pain.

## 2024-06-11 ENCOUNTER — TELEPHONE (OUTPATIENT)
Dept: NURSING | Facility: CLINIC | Age: 74
End: 2024-06-11
Payer: COMMERCIAL

## 2024-06-11 ENCOUNTER — HOSPITAL ENCOUNTER (EMERGENCY)
Facility: CLINIC | Age: 74
Discharge: HOME | End: 2024-06-11
Attending: EMERGENCY MEDICINE | Admitting: EMERGENCY MEDICINE
Payer: COMMERCIAL

## 2024-06-11 VITALS
WEIGHT: 247.8 LBS | SYSTOLIC BLOOD PRESSURE: 143 MMHG | HEIGHT: 70 IN | BODY MASS INDEX: 35.48 KG/M2 | OXYGEN SATURATION: 99 % | TEMPERATURE: 98.3 F | HEART RATE: 68 BPM | RESPIRATION RATE: 20 BRPM | DIASTOLIC BLOOD PRESSURE: 84 MMHG

## 2024-06-11 DIAGNOSIS — R33.9 URINARY RETENTION: ICD-10-CM

## 2024-06-11 DIAGNOSIS — D64.9 ANEMIA, UNSPECIFIED TYPE: ICD-10-CM

## 2024-06-11 DIAGNOSIS — T83.091A OBSTRUCTION OF FOLEY CATHETER, INITIAL ENCOUNTER (H): ICD-10-CM

## 2024-06-11 LAB
ANION GAP SERPL CALCULATED.3IONS-SCNC: 13 MMOL/L (ref 7–15)
BACTERIA UR CULT: NO GROWTH
BASOPHILS # BLD AUTO: 0.1 10E3/UL (ref 0–0.2)
BASOPHILS NFR BLD AUTO: 1 %
BUN SERPL-MCNC: 19.4 MG/DL (ref 8–23)
CALCIUM SERPL-MCNC: 9 MG/DL (ref 8.8–10.2)
CHLORIDE SERPL-SCNC: 103 MMOL/L (ref 98–107)
CREAT SERPL-MCNC: 0.87 MG/DL (ref 0.67–1.17)
DEPRECATED HCO3 PLAS-SCNC: 18 MMOL/L (ref 22–29)
EGFRCR SERPLBLD CKD-EPI 2021: 90 ML/MIN/1.73M2
EOSINOPHIL # BLD AUTO: 0.2 10E3/UL (ref 0–0.7)
EOSINOPHIL NFR BLD AUTO: 3 %
ERYTHROCYTE [DISTWIDTH] IN BLOOD BY AUTOMATED COUNT: 13.4 % (ref 10–15)
GLUCOSE SERPL-MCNC: 147 MG/DL (ref 70–99)
HCT VFR BLD AUTO: 33 % (ref 40–53)
HGB BLD-MCNC: 10.6 G/DL (ref 13.3–17.7)
IMM GRANULOCYTES # BLD: 0 10E3/UL
IMM GRANULOCYTES NFR BLD: 0 %
LYMPHOCYTES # BLD AUTO: 2 10E3/UL (ref 0.8–5.3)
LYMPHOCYTES NFR BLD AUTO: 22 %
MCH RBC QN AUTO: 28.6 PG (ref 26.5–33)
MCHC RBC AUTO-ENTMCNC: 32.1 G/DL (ref 31.5–36.5)
MCV RBC AUTO: 89 FL (ref 78–100)
MONOCYTES # BLD AUTO: 0.6 10E3/UL (ref 0–1.3)
MONOCYTES NFR BLD AUTO: 6 %
NEUTROPHILS # BLD AUTO: 6.3 10E3/UL (ref 1.6–8.3)
NEUTROPHILS NFR BLD AUTO: 68 %
NRBC # BLD AUTO: 0 10E3/UL
NRBC BLD AUTO-RTO: 0 /100
PLATELET # BLD AUTO: 227 10E3/UL (ref 150–450)
POTASSIUM SERPL-SCNC: 4 MMOL/L (ref 3.4–5.3)
RBC # BLD AUTO: 3.7 10E6/UL (ref 4.4–5.9)
SODIUM SERPL-SCNC: 134 MMOL/L (ref 135–145)
WBC # BLD AUTO: 9.2 10E3/UL (ref 4–11)

## 2024-06-11 PROCEDURE — 85004 AUTOMATED DIFF WBC COUNT: CPT | Performed by: EMERGENCY MEDICINE

## 2024-06-11 PROCEDURE — 36415 COLL VENOUS BLD VENIPUNCTURE: CPT | Performed by: EMERGENCY MEDICINE

## 2024-06-11 PROCEDURE — 99284 EMERGENCY DEPT VISIT MOD MDM: CPT | Mod: 25

## 2024-06-11 PROCEDURE — 51798 US URINE CAPACITY MEASURE: CPT

## 2024-06-11 PROCEDURE — 82374 ASSAY BLOOD CARBON DIOXIDE: CPT | Performed by: EMERGENCY MEDICINE

## 2024-06-11 ASSESSMENT — ACTIVITIES OF DAILY LIVING (ADL)
ADLS_ACUITY_SCORE: 33
ADLS_ACUITY_SCORE: 33

## 2024-06-11 ASSESSMENT — COLUMBIA-SUICIDE SEVERITY RATING SCALE - C-SSRS
1. IN THE PAST MONTH, HAVE YOU WISHED YOU WERE DEAD OR WISHED YOU COULD GO TO SLEEP AND NOT WAKE UP?: NO
6. HAVE YOU EVER DONE ANYTHING, STARTED TO DO ANYTHING, OR PREPARED TO DO ANYTHING TO END YOUR LIFE?: NO
2. HAVE YOU ACTUALLY HAD ANY THOUGHTS OF KILLING YOURSELF IN THE PAST MONTH?: NO

## 2024-06-11 NOTE — ED PROVIDER NOTES
Emergency Department Note      History of Present Illness     Chief Complaint  Catheter Problem    HPI  Lucius Anders is a 74 year old male who presents to the ER with wife for evaluation of poor urine output from Schultz catheter.  Schultz catheter was placed 3 days ago per below after presenting with urinary retention.  He returned again the next day and there was a clot in the Schultz catheter requiring manual irrigation to remove.  Patient reports that he last emptied the Schultz catheter bag around 10 AM this morning and has had no urine output since then.  No fever.  He is still taking the ceftin that was prescribed to him for equivocal urinalysis at the visit 2 days ago.   He has a fair amount of discomfort in the suprapubic region. No other concerns at this time.     Review of External Notes  Urology operative note 6/8/2024 when patient underwent cystourethroscopy with complex Schultz catheter placement after presenting with urinary retention in the setting of previous cystolitholapaxy on 6/3/2024 at [facility].  There was evidence of scar tissue in the bulbar urethra and large false passage.    Patient then returned on 6/9/2024 with unable to pass any urine despite having a Schultz catheter in place.  There was found to be blood clot in the Schultz catheter tubing which was removed followed by return of urine flow.  Urology was going to teach the patient how to do this at home but family was tired and desired to leave.  Urinalysis was abnormal but difficult to interpret.  Ceftin was ordered while awaiting urine culture.  Past Medical History   Medical History and Problem List  No past medical history on file.    Medications  aspirin (ASA) 325 MG EC tablet  atenolol (TENORMIN) 25 MG tablet  atorvastatin (LIPITOR) 40 MG tablet  cefuroxime (CEFTIN) 500 MG tablet  glipiZIDE (GLUCOTROL XL) 10 MG 24 hr tablet  losartan (COZAAR) 100 MG tablet  metFORMIN (GLUCOPHAGE XR) 500 MG 24 hr tablet  multivitamin (CENTRUM SILVER)  "tablet  nitroGLYcerin (NITROSTAT) 0.4 MG sublingual tablet  tamsulosin (FLOMAX) 0.4 MG capsule        Surgical History   Past Surgical History:   Procedure Laterality Date    CYSTOSCOPY, URETEROSCOPY, COMBINED N/A 6/8/2024    Procedure: Cystourethroscopy with complex barrios catheter placement, intraoperative cystogram, intraoperative interpretation of fluoroscopic imaging;  Surgeon: Javi Dumont MD;  Location: [facility] OR     Physical Exam   Patient Vitals for the past 24 hrs:   BP Temp Temp src Pulse Resp SpO2 Height Weight   06/11/24 1650 (!) 143/84 98.3  F (36.8  C) Oral 68 20 99 % -- --   06/11/24 1448 (!) 146/94 -- -- 74 -- -- -- --   06/11/24 1424 (!) 162/87 97.7  F (36.5  C) Oral 77 20 99 % 1.778 m (5' 10\") 112.4 kg (247 lb 12.8 oz)     Physical Exam  VS: Reviewed per above  HENT: Mucous membranes moist  EYES: sclera anicteric  CV: Rate as noted  RESP: Effort normal.  GI: mild suprapubic tenderness without rebound/guarding, not distended.  NEURO: Alert, moving all extremities  MSK: No deformity of the extremities  SKIN: Warm and dry      Diagnostics   Lab Results   Labs Ordered and Resulted from Time of ED Arrival to Time of ED Departure   BASIC METABOLIC PANEL - Abnormal       Result Value    Sodium 134 (*)     Potassium 4.0      Chloride 103      Carbon Dioxide (CO2) 18 (*)     Anion Gap 13      Urea Nitrogen 19.4      Creatinine 0.87      GFR Estimate 90      Calcium 9.0      Glucose 147 (*)    CBC WITH PLATELETS AND DIFFERENTIAL - Abnormal    WBC Count 9.2      RBC Count 3.70 (*)     Hemoglobin 10.6 (*)     Hematocrit 33.0 (*)     MCV 89      MCH 28.6      MCHC 32.1      RDW 13.4      Platelet Count 227      % Neutrophils 68      % Lymphocytes 22      % Monocytes 6      % Eosinophils 3      % Basophils 1      % Immature Granulocytes 0      NRBCs per 100 WBC 0      Absolute Neutrophils 6.3      Absolute Lymphocytes 2.0      Absolute Monocytes 0.6      Absolute Eosinophils 0.2      Absolute Basophils 0.1      " Absolute Immature Granulocytes 0.0      Absolute NRBCs 0.0            Medical Decision Making / Diagnosis       EVETTE Anders is a 74 year old male who presents to the ER for evaluation of lack of output from Schultz catheter x 1 day.  Vital signs reassuring.  There is some mild suprapubic tenderness.  Bedside bladder scan reveals 4 to 500 cc of urine in the bladder.  Schultz catheter was irrigated with removal of a very small blood clot followed by over 600 cc of bo-colored urine.  Patient has been taking antibiotics for possible UTI although recent urine culture was negative.  Renal function is baseline but hemoglobin has dropped by 2 points over the past few days.  It is unclear how much of this is related to true blood loss versus lab variability.  He denies any black or blood in the stool or other sources of blood loss aside from in the urine.  Urine today does not appear to have significant gross hematuria.  Recommended close urology follow-up for discussion of his recent Schultz catheter occlusions as well as hemoglobin recheck.  Return precautions discussed with patient and spouse prior to discharge.      Disposition  The patient was discharged.     ICD-10 Codes:    ICD-10-CM    1. Anemia, unspecified type  D64.9       2. Obstruction of Schultz catheter, initial encounter (H24)  T83.091A       3. Urinary retention  R33.9            Discharge Medications  Discharge Medication List as of 6/11/2024  4:58 PM           James [place] MD  06/11/24 9213

## 2024-06-11 NOTE — ED TRIAGE NOTES
Estimated 10-12 days ago Pt had ureteral stones removed, and a catheter was placed. Since then, he has been to the ER 3 times with a blocked catheter due to clots. Pt is complaining of almost no urinary output since 1000 and pain in his groin.      Triage Assessment (Adult)       Row Name 06/11/24 1427          Triage Assessment    Airway WDL WDL        Respiratory WDL    Respiratory WDL WDL        Skin Circulation/Temperature WDL    Skin Circulation/Temperature WDL WDL        Cardiac WDL    Cardiac WDL WDL        Peripheral/Neurovascular WDL    Peripheral Neurovascular WDL WDL        Cognitive/Neuro/Behavioral WDL    Cognitive/Neuro/Behavioral WDL WDL

## 2024-06-11 NOTE — ED NOTES
Writer obtained irrigation kit with 50cc leur lock syringe. Using sterile technique, barrios catheter was flushed with 50cc, then 40cc was aspirated. Smaller clots measuring about 2-3mm in length. Aspirated fluid was yellow, clear. 50cc's were injected two more times and aspirated as well. On 2nd aspiration, a long clot measuring ~10mm in length came out. On 3rd flush and aspiration no new clots were seen. Barrios catheter draining well with ~800cc out. Color is yellow with faint red tinge, clear, more sediments and smaller clots coming out as well. Pt endorses relief.     Daryl Guadalupe RN

## 2024-06-11 NOTE — LETTER
June 11, 2024        Lucius Anders  [id number] [address] [address]          Dear Lucius Anders:    You were seen in the [facility] Emergency Department at [facility] on 6/9/2024.  We are unable to reach you by phone, so we are sending you this letter.     It is important that you call [facility] Emergency Department lab result nurse at [phone], as we have information to relay to you AND/OR we MAY have to make some changes in your treatment.    Best time to call back is between 9AM and 5:30PM, 7 days a week.      Sincerely,     [facility] Emergency Department Lab Result RN  [phone]

## 2024-06-11 NOTE — TELEPHONE ENCOUNTER
"[facility]    Reason for call: Lab Result Notification     Lab Result (including Rx patient on, if applicable).  If culture, copy of lab report at bottom.  Lab Result: See below    ED Rx: cefuroxime (CEFTIN) 500 MG tablet - Take 1 tablet (500 mg) by mouth 2 times daily for 7 days     Culture with \"No growth\". Patient should stop antibiotics.     Creatinine Level (mg/dl)   Creatinine   Date Value Ref Range Status   06/08/2024 1.06 0.67 - 1.17 mg/dL Final    Creatinine clearance (ml/min), if applicable    Serum creatinine: 1.06 mg/dL 06/08/24 1821  Estimated creatinine clearance: 74.4 mL/min     Patient's current Symptoms:   Unable to assess. Unable to leave a message. Letter sent.    RN Recommendations/Instructions per [place] ED lab result protocol:   [facility] ED lab result protocol utilized: Urine culture    SCOTTIE ANDERSON RN  "

## 2024-06-12 ENCOUNTER — HOSPITAL ENCOUNTER (EMERGENCY)
Facility: CLINIC | Age: 74
Discharge: HOME | End: 2024-06-12
Attending: EMERGENCY MEDICINE | Admitting: EMERGENCY MEDICINE
Payer: COMMERCIAL

## 2024-06-12 VITALS
RESPIRATION RATE: 18 BRPM | TEMPERATURE: 98 F | DIASTOLIC BLOOD PRESSURE: 133 MMHG | OXYGEN SATURATION: 99 % | SYSTOLIC BLOOD PRESSURE: 156 MMHG | HEART RATE: 94 BPM

## 2024-06-12 DIAGNOSIS — T83.091D: ICD-10-CM

## 2024-06-12 PROCEDURE — 99284 EMERGENCY DEPT VISIT MOD MDM: CPT

## 2024-06-12 PROCEDURE — 51798 US URINE CAPACITY MEASURE: CPT

## 2024-06-12 ASSESSMENT — ACTIVITIES OF DAILY LIVING (ADL): ADLS_ACUITY_SCORE: 35

## 2024-06-12 NOTE — ED TRIAGE NOTES
Schultz cath placed Saturday. Irrigated today. Emptied at 2100 this evening, less than 100mL output since. Feels pain and pressure in bladder. A&Ox4. Very hard of hearing.

## 2024-06-12 NOTE — ED NOTES
Bladder scanned pt with 483ml urine, approx 100ml output from the bag. Did manual irrigation seen few big clots, UA finally flowing to catheter, Bag is now at 700ml.

## 2024-06-12 NOTE — ED NOTES
Pt discharged, AVS given, Did teaching how to manually irrigate pt. Offered wheelchair pt declines.

## 2024-06-12 NOTE — ED PROVIDER NOTES
History     Chief Complaint:  Catheter Problem       HPI   Lucius Anders is a 74 year old male who presents to the emergency department with obstruction of his Schultz catheter.  In looking through the patient's notes I reviewed the urology note from 5/10/2024 from Dr. Shetty through [facility].  Patient was seen for gross hematuria after 2 treatments with antibiotics and negative urine cultures.  A CT scan that was obtained prior to that evaluation showed a large bladder stone of 2.5 cm.  Patient ultimately went a bladder stone removal with laser at the beginning of this month on the third.  Unfortunately he was seen in our emergency department on 6/8 for acute urinary retention and obstruction.  Patient went through a traumatic Schultz catheter placement at that time with creation of false passage.  Urology was then involved and patient was taken to the operating room for complex placement of a Schultz catheter.  Since then patient has been seen on 6/9 and 6/9 for Schultz catheter obstruction.  On both occasions his Schultz catheter was irrigated and Schultz function was restored.  Patient is currently on course of antibiotics that was prescribed on his visit on 6/9.  Patient returns to our emergency department tonight with ongoing urinary retention, fullness in his abdomen and concern for Schultz catheter obstruction.    Review of External Notes:  Noted above       Medications:    aspirin (ASA) 325 MG EC tablet  atenolol (TENORMIN) 25 MG tablet  atorvastatin (LIPITOR) 40 MG tablet  cefuroxime (CEFTIN) 500 MG tablet  glipiZIDE (GLUCOTROL XL) 10 MG 24 hr tablet  losartan (COZAAR) 100 MG tablet  metFORMIN (GLUCOPHAGE XR) 500 MG 24 hr tablet  multivitamin (CENTRUM SILVER) tablet  nitroGLYcerin (NITROSTAT) 0.4 MG sublingual tablet  tamsulosin (FLOMAX) 0.4 MG capsule        Past Medical History:    No past medical history on file.    Past Surgical History:    Past Surgical History:   Procedure Laterality Date     CYSTOSCOPY, URETEROSCOPY, COMBINED N/A 6/8/2024    Procedure: Cystourethroscopy with complex barrios catheter placement, intraoperative cystogram, intraoperative interpretation of fluoroscopic imaging;  Surgeon: Javi Dumont MD;  Location:  OR          Physical Exam   Patient Vitals for the past 24 hrs:   BP Temp Temp src Pulse Resp SpO2   06/12/24 0236 (!) 156/133 98  F (36.7  C) Temporal 94 18 99 %        Physical Exam  General: Patient is awake, alert and interactive when I enter the room. Appears uncomfortable.   Head: The scalp, face, and head appear normal  Eyes: Conjunctivae and sclerae are normal  Neck: Normal range of motion.   CV: Regular rate.   Resp:  No respiratory distress.   GI: suprapubic tenderness but abdomen is soft, no rigidity. No evidence of pulsatile mass. No fluid waves or evidence of ascites. No distension. No hernias or bruising are noted in detailed exam. No CVA tenderness.    MS: Normal tone.   Skin: Normal capillary refill noted  Neuro: Speech is normal and fluent. Face is symmetric. Moving all extremities.   Psych:  Normal affect.  Appropriate interactions.      Emergency Department Course       Emergency Department Course & Assessments:       Disposition:  The patient was discharged.    Impression & Plan      Medical Decision Making:  Patient is a 74-year-old gentleman who presents emergency department with Barrios catheter obstruction.  Please see HPI for further details.  We are once again able to flush his Barrios catheter without much difficulty and the patient was able to empty his bladder.  We were able to irrigate several large clots.  The patient and wife were once again educated on Barrios catheter irrigation.  They were given several supplies as well.  Recommended close follow-up with urology and return the emergency room with any new or worsening symptoms.    Diagnosis:    ICD-10-CM    1. Obstructed Barrios catheter, subsequent encounter  T83.091D            Brian FARIAS  MD Briana Warrne Christopher Joseph, MD  06/12/24 1341

## 2025-04-03 ENCOUNTER — HOSPITAL ENCOUNTER (EMERGENCY)
Facility: CLINIC | Age: 75
End: 2025-04-03
Attending: EMERGENCY MEDICINE
Payer: COMMERCIAL

## 2025-04-03 DIAGNOSIS — D64.9 SYMPTOMATIC ANEMIA: ICD-10-CM

## 2025-04-03 DIAGNOSIS — E61.1 IRON DEFICIENCY: ICD-10-CM

## 2025-04-03 LAB
ABO + RH BLD: NORMAL
ALBUMIN SERPL BCG-MCNC: 4.2 G/DL (ref 3.5–5.2)
ALP SERPL-CCNC: 89 U/L (ref 40–150)
ALT SERPL W P-5'-P-CCNC: 16 U/L (ref 0–70)
ANION GAP SERPL CALCULATED.3IONS-SCNC: 13 MMOL/L (ref 7–15)
APTT PPP: 24 SECONDS (ref 22–38)
AST SERPL W P-5'-P-CCNC: 17 U/L (ref 0–45)
BASOPHILS # BLD AUTO: 0.1 10E3/UL (ref 0–0.2)
BASOPHILS NFR BLD AUTO: 1 %
BILIRUB SERPL-MCNC: 0.2 MG/DL
BLD GP AB SCN SERPL QL: NEGATIVE
BLD PROD TYP BPU: NORMAL
BLD PROD TYP BPU: NORMAL
BLOOD COMPONENT TYPE: NORMAL
BLOOD COMPONENT TYPE: NORMAL
BUN SERPL-MCNC: 26.9 MG/DL (ref 8–23)
CALCIUM SERPL-MCNC: 9.7 MG/DL (ref 8.8–10.4)
CHLORIDE SERPL-SCNC: 105 MMOL/L (ref 98–107)
CODING SYSTEM: NORMAL
CODING SYSTEM: NORMAL
CREAT SERPL-MCNC: 1.08 MG/DL (ref 0.67–1.17)
CROSSMATCH: NORMAL
CROSSMATCH: NORMAL
EGFRCR SERPLBLD CKD-EPI 2021: 71 ML/MIN/1.73M2
EOSINOPHIL # BLD AUTO: 0.2 10E3/UL (ref 0–0.7)
EOSINOPHIL NFR BLD AUTO: 3 %
ERYTHROCYTE [DISTWIDTH] IN BLOOD BY AUTOMATED COUNT: 16 % (ref 10–15)
GLUCOSE SERPL-MCNC: 105 MG/DL (ref 70–99)
HCO3 SERPL-SCNC: 20 MMOL/L (ref 22–29)
HCT VFR BLD AUTO: 22 % (ref 40–53)
HEMOCCULT STL QL: NEGATIVE
HGB BLD-MCNC: 6.2 G/DL (ref 13.3–17.7)
HOLD SPECIMEN: NORMAL
HOLD SPECIMEN: NORMAL
IMM GRANULOCYTES # BLD: 0 10E3/UL
IMM GRANULOCYTES NFR BLD: 0 %
INR PPP: 0.99 (ref 0.85–1.15)
ISSUE DATE AND TIME: NORMAL
ISSUE DATE AND TIME: NORMAL
LYMPHOCYTES # BLD AUTO: 2.4 10E3/UL (ref 0.8–5.3)
LYMPHOCYTES NFR BLD AUTO: 29 %
MCH RBC QN AUTO: 19.4 PG (ref 26.5–33)
MCHC RBC AUTO-ENTMCNC: 28.2 G/DL (ref 31.5–36.5)
MCV RBC AUTO: 69 FL (ref 78–100)
MONOCYTES # BLD AUTO: 0.6 10E3/UL (ref 0–1.3)
MONOCYTES NFR BLD AUTO: 7 %
NEUTROPHILS # BLD AUTO: 5 10E3/UL (ref 1.6–8.3)
NEUTROPHILS NFR BLD AUTO: 60 %
NRBC # BLD AUTO: 0 10E3/UL
NRBC BLD AUTO-RTO: 0 /100
PLATELET # BLD AUTO: 376 10E3/UL (ref 150–450)
POTASSIUM SERPL-SCNC: 4.5 MMOL/L (ref 3.4–5.3)
PROT SERPL-MCNC: 6.7 G/DL (ref 6.4–8.3)
RBC # BLD AUTO: 3.19 10E6/UL (ref 4.4–5.9)
SODIUM SERPL-SCNC: 138 MMOL/L (ref 135–145)
SPECIMEN EXP DATE BLD: NORMAL
TROPONIN T SERPL HS-MCNC: 13 NG/L
UNIT ABO/RH: NORMAL
UNIT ABO/RH: NORMAL
UNIT NUMBER: NORMAL
UNIT NUMBER: NORMAL
UNIT STATUS: NORMAL
UNIT STATUS: NORMAL
UNIT TYPE ISBT: 6200
UNIT TYPE ISBT: 6200
WBC # BLD AUTO: 8.3 10E3/UL (ref 4–11)

## 2025-04-03 PROCEDURE — 85004 AUTOMATED DIFF WBC COUNT: CPT | Performed by: EMERGENCY MEDICINE

## 2025-04-03 PROCEDURE — 84075 ASSAY ALKALINE PHOSPHATASE: CPT | Performed by: EMERGENCY MEDICINE

## 2025-04-03 PROCEDURE — 36430 TRANSFUSION BLD/BLD COMPNT: CPT

## 2025-04-03 PROCEDURE — 82272 OCCULT BLD FECES 1-3 TESTS: CPT | Performed by: EMERGENCY MEDICINE

## 2025-04-03 PROCEDURE — 86850 RBC ANTIBODY SCREEN: CPT | Performed by: EMERGENCY MEDICINE

## 2025-04-03 PROCEDURE — 85014 HEMATOCRIT: CPT | Performed by: EMERGENCY MEDICINE

## 2025-04-03 PROCEDURE — 85730 THROMBOPLASTIN TIME PARTIAL: CPT | Performed by: EMERGENCY MEDICINE

## 2025-04-03 PROCEDURE — 84484 ASSAY OF TROPONIN QUANT: CPT | Performed by: EMERGENCY MEDICINE

## 2025-04-03 PROCEDURE — 85610 PROTHROMBIN TIME: CPT | Performed by: EMERGENCY MEDICINE

## 2025-04-03 PROCEDURE — P9016 RBC LEUKOCYTES REDUCED: HCPCS | Performed by: EMERGENCY MEDICINE

## 2025-04-03 PROCEDURE — 36415 COLL VENOUS BLD VENIPUNCTURE: CPT | Performed by: EMERGENCY MEDICINE

## 2025-04-03 PROCEDURE — 80051 ELECTROLYTE PANEL: CPT | Performed by: EMERGENCY MEDICINE

## 2025-04-03 PROCEDURE — 99291 CRITICAL CARE FIRST HOUR: CPT

## 2025-04-03 PROCEDURE — 86900 BLOOD TYPING SEROLOGIC ABO: CPT | Performed by: EMERGENCY MEDICINE

## 2025-04-03 PROCEDURE — 86923 COMPATIBILITY TEST ELECTRIC: CPT | Performed by: EMERGENCY MEDICINE

## 2025-04-03 PROCEDURE — 93005 ELECTROCARDIOGRAM TRACING: CPT

## 2025-04-03 PROCEDURE — 82947 ASSAY GLUCOSE BLOOD QUANT: CPT | Performed by: EMERGENCY MEDICINE

## 2025-04-03 ASSESSMENT — COLUMBIA-SUICIDE SEVERITY RATING SCALE - C-SSRS
2. HAVE YOU ACTUALLY HAD ANY THOUGHTS OF KILLING YOURSELF IN THE PAST MONTH?: NO
6. HAVE YOU EVER DONE ANYTHING, STARTED TO DO ANYTHING, OR PREPARED TO DO ANYTHING TO END YOUR LIFE?: NO
1. IN THE PAST MONTH, HAVE YOU WISHED YOU WERE DEAD OR WISHED YOU COULD GO TO SLEEP AND NOT WAKE UP?: NO

## 2025-04-03 ASSESSMENT — ACTIVITIES OF DAILY LIVING (ADL)
ADLS_ACUITY_SCORE: 41

## 2025-04-04 VITALS
BODY MASS INDEX: 34.4 KG/M2 | RESPIRATION RATE: 12 BRPM | HEART RATE: 92 BPM | OXYGEN SATURATION: 97 % | HEIGHT: 70 IN | DIASTOLIC BLOOD PRESSURE: 76 MMHG | TEMPERATURE: 96.9 F | SYSTOLIC BLOOD PRESSURE: 133 MMHG | WEIGHT: 240.3 LBS

## 2025-04-04 VITALS
HEART RATE: 87 BPM | TEMPERATURE: 98.5 F | DIASTOLIC BLOOD PRESSURE: 85 MMHG | BODY MASS INDEX: 34.4 KG/M2 | SYSTOLIC BLOOD PRESSURE: 148 MMHG | WEIGHT: 240.3 LBS | RESPIRATION RATE: 18 BRPM | HEIGHT: 70 IN | OXYGEN SATURATION: 96 %

## 2025-04-04 LAB
ATRIAL RATE - MUSE: 88 BPM
DIASTOLIC BLOOD PRESSURE - MUSE: NORMAL MMHG
INTERPRETATION ECG - MUSE: NORMAL
IRON BINDING CAPACITY (ROCHE): 509 UG/DL (ref 240–430)
IRON SATN MFR SERPL: 3 % (ref 15–46)
IRON SERPL-MCNC: 17 UG/DL (ref 61–157)
P AXIS - MUSE: 41 DEGREES
PR INTERVAL - MUSE: 202 MS
QRS DURATION - MUSE: 98 MS
QT - MUSE: 340 MS
QTC - MUSE: 411 MS
R AXIS - MUSE: 0 DEGREES
SYSTOLIC BLOOD PRESSURE - MUSE: NORMAL MMHG
T AXIS - MUSE: 83 DEGREES
TROPONIN T SERPL HS-MCNC: 14 NG/L
VENTRICULAR RATE- MUSE: 88 BPM

## 2025-04-04 PROCEDURE — P9016 RBC LEUKOCYTES REDUCED: HCPCS | Performed by: EMERGENCY MEDICINE

## 2025-04-04 PROCEDURE — 83550 IRON BINDING TEST: CPT | Performed by: EMERGENCY MEDICINE

## 2025-04-04 PROCEDURE — 84484 ASSAY OF TROPONIN QUANT: CPT | Performed by: EMERGENCY MEDICINE

## 2025-04-04 PROCEDURE — 83540 ASSAY OF IRON: CPT | Performed by: EMERGENCY MEDICINE

## 2025-04-04 PROCEDURE — 36415 COLL VENOUS BLD VENIPUNCTURE: CPT | Performed by: EMERGENCY MEDICINE

## 2025-04-04 RX ORDER — DOCUSATE SODIUM 100 MG/1
100 CAPSULE, LIQUID FILLED ORAL 2 TIMES DAILY
Qty: 60 CAPSULE | Refills: 0 | Status: SHIPPED | OUTPATIENT
Start: 2025-04-04 | End: 2025-05-04

## 2025-04-04 RX ORDER — FERROUS SULFATE 325(65) MG
325 TABLET ORAL
Qty: 30 TABLET | Refills: 0 | Status: SHIPPED | OUTPATIENT
Start: 2025-04-04 | End: 2025-05-04

## 2025-04-04 ASSESSMENT — ACTIVITIES OF DAILY LIVING (ADL)
ADLS_ACUITY_SCORE: 41

## 2025-04-04 NOTE — ED NOTES
"[facility]  ED Nurse Handoff Report    ED Chief complaint: Abnormal Labs  . ED Diagnosis:   Final diagnoses:   None       Allergies: No Known Allergies    Code Status: Full Code    Activity level - Baseline/Home:  independent.  Activity Level - Current:   independent.   Lift room needed: No.   Bariatric: No   Needed: No   Isolation: No.   Infection: Not Applicable.     Respiratory status: Room air    Vital Signs (within 30 minutes):   Vitals:    04/03/25 2000 04/03/25 2225   BP: (!) 161/91 (!) 162/80   Pulse: 97 88   Resp: 18 24   Temp: 98.1  F (36.7  C) 98.4  F (36.9  C)   TempSrc: Oral    SpO2: 98%    Weight: 109 kg (240 lb 4.8 oz)    Height: 1.778 m (5' 10\")        Cardiac Rhythm:  ,      Pain level:    Patient confused: No.   Patient Falls Risk: nonskid shoes/slippers when out of bed, arm band in place, and patient and family education.   Elimination Status: Has voided     Patient Report - Initial Complaint: Lucius Anders is a 75 year old male who presents to the emergency department for evaluation of progressive dyspnea on exertion over the course of the last 4 months or so.  This has become symptomatic to the point that even bending over the nose and dyspnea.  He does have a known history of coronary artery disease.  He notes some mild discomfort although he does not describe it as pain in the epigastric area at times.  Given his known history of coronary artery disease he saw his cardiologist who is planning an upcoming PCI.  Preop labs demonstrated anemia with a hemoglobin of 6.5 and he was referred into the ED.  He is not aware of any black or bloody stools.  He is never been anemic in the past.  He is not sure if he is ever had a colonoscopy. .   Focused Assessment: Patient had blood work drawn as part of a preop and was found to have a hemoglobin of 6.5. Patient denies any noted bleeding but reports feeling SOB. Patient is very [place]      Abnormal Results:   Labs Ordered " and Resulted from Time of ED Arrival to Time of ED Departure   COMPREHENSIVE METABOLIC PANEL - Abnormal       Result Value    Sodium 138      Potassium 4.5      Carbon Dioxide (CO2) 20 (*)     Anion Gap 13      Urea Nitrogen 26.9 (*)     Creatinine 1.08      GFR Estimate 71      Calcium 9.7      Chloride 105      Glucose 105 (*)     Alkaline Phosphatase 89      AST 17      ALT 16      Protein Total 6.7      Albumin 4.2      Bilirubin Total 0.2     CBC WITH PLATELETS AND DIFFERENTIAL - Abnormal    WBC Count 8.3      RBC Count 3.19 (*)     Hemoglobin 6.2 (*)     Hematocrit 22.0 (*)     MCV 69 (*)     MCH 19.4 (*)     MCHC 28.2 (*)     RDW 16.0 (*)     Platelet Count 376      % Neutrophils 60      % Lymphocytes 29      % Monocytes 7      % Eosinophils 3      % Basophils 1      % Immature Granulocytes 0      NRBCs per 100 WBC 0      Absolute Neutrophils 5.0      Absolute Lymphocytes 2.4      Absolute Monocytes 0.6      Absolute Eosinophils 0.2      Absolute Basophils 0.1      Absolute Immature Granulocytes 0.0      Absolute NRBCs 0.0     INR - Normal    INR 0.99     PARTIAL THROMBOPLASTIN TIME - Normal    aPTT 24     TROPONIN T, HIGH SENSITIVITY - Normal    Troponin T, High Sensitivity 13     OCCULT BLOOD STOOL - Normal    Occult Blood Negative     TROPONIN T, HIGH SENSITIVITY   TYPE AND SCREEN, ADULT    ABO/RH(D) A POS      Antibody Screen Negative      SPECIMEN EXPIRATION DATE [id number]     PREPARE RED BLOOD CELLS (UNIT)    Blood Component Type Red Blood Cells      Product Code M0965R51      Unit Status Transfused      Unit Number [id number]      CROSSMATCH Compatible      CODING SYSTEM [id number]      ISSUE DATE AND TIME [id number]      UNIT ABO/RH A+      UNIT TYPE ISBT 6200     PREPARE RED BLOOD CELLS (UNIT)   PREPARE RED BLOOD CELLS (UNIT)   TRANSFUSE RED BLOOD CELLS (UNIT)   ABO/RH TYPE AND SCREEN        No orders to display       Treatments provided: PRBC's  Family Comments: wife  OBS brochure/video  discussed/provided to patient:  N/A  ED Medications: Medications - No data to display    Drips infusing:  No  For the majority of the shift this patient was Green.   Interventions performed were NA.    Sepsis treatment initiated: No    Cares/treatment/interventions/medications to be completed following ED care: See orders    ED Nurse Name: Marina Chaves RN  10:36 PM

## 2025-04-04 NOTE — DISCHARGE INSTRUCTIONS
Contact your cardiologist for further advice regards to whether you should go forward with your cardiac catheterization.  Follow-up with your primary care clinic on Monday to arrange repeat lab work and for further evaluation as to the cause of your anemia.    You should take the iron supplement prescribed.  This can cause constipation so stool softener has been prescribed as well.    Return to the ER for chest pain, increased weakness or shortness of breath, or for any new concerns.

## 2025-04-04 NOTE — ED PROVIDER NOTES
Emergency Department Note      History of Present Illness     Chief Complaint   Abnormal Labs      HPI   Lucius Anders is a 75 year old male who presents to the emergency department for evaluation of progressive dyspnea on exertion over the course of the last 4 months or so.  This has become symptomatic to the point that even bending over the nose and dyspnea.  He does have a known history of coronary artery disease.  He notes some mild discomfort although he does not describe it as pain in the epigastric area at times.  Given his known history of coronary artery disease he saw his cardiologist who is planning an upcoming PCI.  Preop labs demonstrated anemia with a hemoglobin of 6.5 and he was referred into the ED.  He is not aware of any black or bloody stools.  He is never been anemic in the past.  He is not sure if he is ever had a colonoscopy.    Independent Historian   History taken from the patient's wife who contributes to his past medical history and the progression of his dyspnea.  She feels that he is pale but is not able to quantify the timeframe that this is developed.    Review of External Notes   Cardiology notes from the patient's Replaced by [facility] admission on February 28, 2025 reviewed.  The patient has a history of coronary artery disease affecting the anterior wall with a PCI to the LAD at that time in 2003.  In 2004 he had a PCI to the ramus intermedius.  He has known heart failure with reduced ejection fraction in the 40 to 45% range.    Past Medical History     Medical History and Problem List   Coronary artery disease  Diabetes  Obesity  Hypertension  Hyperlipidemia    Medications   aspirin (ASA) 325 MG EC tablet  atenolol (TENORMIN) 25 MG tablet  atorvastatin (LIPITOR) 40 MG tablet  glipiZIDE (GLUCOTROL XL) 10 MG 24 hr tablet  losartan (COZAAR) 100 MG tablet  metFORMIN (GLUCOPHAGE XR) 500 MG 24 hr tablet  multivitamin (CENTRUM SILVER) tablet  nitroGLYcerin (NITROSTAT) 0.4 MG sublingual  "tablet  tamsulosin (FLOMAX) 0.4 MG capsule        Surgical History   Past Surgical History:   Procedure Laterality Date    CYSTOSCOPY, URETEROSCOPY, COMBINED N/A 6/8/2024    Procedure: Cystourethroscopy with complex barrios catheter placement, intraoperative cystogram, intraoperative interpretation of fluoroscopic imaging;  Surgeon: Javi Dumont MD;  Location:  OR       Physical Exam     Patient Vitals for the past 24 hrs:   BP Temp Temp src Pulse Resp SpO2 Height Weight   04/03/25 2000 (!) 161/91 98.1  F (36.7  C) Oral 97 18 98 % 1.778 m (5' 10\") 109 kg (240 lb 4.8 oz)     Physical Exam  Constitutional:       General: He is not in acute distress.     Appearance: Normal appearance. He is not toxic-appearing.   HENT:      Head: Atraumatic.   Eyes:      General: No scleral icterus.     Conjunctiva/sclera: Conjunctivae normal.   Cardiovascular:      Rate and Rhythm: Normal rate and regular rhythm.      Comments: Systolic murmur heard across the precordium.  Pulmonary:      Effort: Pulmonary effort is normal. No respiratory distress.      Breath sounds: Normal breath sounds.   Abdominal:      Palpations: Abdomen is soft.      Tenderness: There is no abdominal tenderness.   Musculoskeletal:         General: No deformity.      Cervical back: Neck supple.      Right lower leg: No edema.      Left lower leg: No edema.   Skin:     General: Skin is warm.      Capillary Refill: Capillary refill takes less than 2 seconds.      Coloration: Skin is pale.   Neurological:      General: No focal deficit present.      Mental Status: He is alert and oriented to person, place, and time.   Psychiatric:         Mood and Affect: Mood normal.         Behavior: Behavior normal.           Diagnostics     Lab Results   Labs Ordered and Resulted from Time of ED Arrival to Time of ED Departure - No data to display    Imaging   No orders to display       EKG   ECG taken at ***, ECG read at ***  ***   *** as compared to prior, dated " "***/***/***.  Rate *** bpm. SC interval *** ms. QRS duration *** ms. QT/QTc ***/*** ms. P-R-T axes *** *** ***.    Independent Interpretation   {IndependentReview:626082::\"None\"}    ED Course      Medications Administered   Medications - No data to display    Procedures   Procedures     Discussion of Management   {Consults/Care Discussions:695070::\"None\"}    ED Course        Additional Documentation  {EPPAAdditionalPhrase:102595::\"None\"}    Medical Decision Making / Diagnosis     CMS Diagnoses: {Sepsis/Septic Shock/Stemi/Stroke:166927::\"None\"}    MIPS       {EPPA MIPS:005538::\"None\"}    [facility]   Lucius Anders is a 75 year old male ***    Disposition   {EPPAFV Dispo:343599}    Diagnosis   No diagnosis found.     Discharge Medications   New Prescriptions    No medications on file         {Provider or scribe signature:624174}   " 0.0     IRON AND IRON BINDING CAPACITY - Abnormal    Iron 17 (*)     Iron Binding Capacity 509 (*)     Iron Sat Index 3 (*)    INR - Normal    INR 0.99     PARTIAL THROMBOPLASTIN TIME - Normal    aPTT 24     TROPONIN T, HIGH SENSITIVITY - Normal    Troponin T, High Sensitivity 13     OCCULT BLOOD STOOL - Normal    Occult Blood Negative     TROPONIN T, HIGH SENSITIVITY - Normal    Troponin T, High Sensitivity 14     TYPE AND SCREEN, ADULT    ABO/RH(D) A POS      Antibody Screen Negative      SPECIMEN EXPIRATION DATE [id number]     PREPARE RED BLOOD CELLS (UNIT)    Blood Component Type Red Blood Cells      Product Code U1316O08      Unit Status Transfused      Unit Number [id number]      CROSSMATCH Compatible      CODING SYSTEM [id number]      ISSUE DATE AND TIME [id number]      UNIT ABO/RH A+      UNIT TYPE ISBT 6200     PREPARE RED BLOOD CELLS (UNIT)    Blood Component Type Red Blood Cells      Product Code [id number]      Unit Status Transfused      Unit Number [id number]      CROSSMATCH Compatible      CODING SYSTEM [id number]      ISSUE DATE AND TIME [id number]      UNIT ABO/RH A+      UNIT TYPE ISBT 6200     PREPARE RED BLOOD CELLS (UNIT)   PREPARE RED BLOOD CELLS (UNIT)   TRANSFUSE RED BLOOD CELLS (UNIT)   TRANSFUSE RED BLOOD CELLS (UNIT)   ABO/RH TYPE AND SCREEN       EKG   Rate 88.  Normal sinus rhythm.  Intervals within normal limits.  There is an anterior Q wave and T wave inversion in aVL.  No prior for comparison.    Medical Decision Making / Diagnosis       EVETTE   Lucius Anders is a 75 year old male who presents with weakness and shortness of breath over the course of 4 to 5 months.  He had preop labs done today that incidentally demonstrated anemia.  This likely does at least in part account for his symptoms.  No evidence of cardiac ischemia and his workup today.  He received 2 units of packed red cells and overall remains hemodynamically stable.    The etiology of the anemia is not entirely  clear.  Stools guaiac negative and he gives no history of abdominal pain or melena to suggest a GI bleed.  He is iron deficient and has microcytic anemia and this likely is a large contributor to his anemia.  He will need further workup though as I discussed with him.    The patient will be started on an iron supplement and stool softener.  He was due to have a cardiac cath early next week.  He will contact his cardiologist for further advice in regards to whether this will proceed.    He is appropriate for discharge at this point.  Questions were answered.    Disposition   The patient was discharged.     Diagnosis     ICD-10-CM    1. Symptomatic anemia  D64.9       2. Iron deficiency  E61.1            Discharge Medications   New Prescriptions    DOCUSATE SODIUM (COLACE) 100 MG CAPSULE    Take 1 capsule (100 mg) by mouth 2 times daily.    FERROUS SULFATE (FEROSUL) 325 (65 FE) MG TABLET    Take 1 tablet (325 mg) by mouth daily (with breakfast).               Jarocho Toney MD  04/04/25 0256

## 2025-04-04 NOTE — ED TRIAGE NOTES
Patient had blood work drawn as part of a preop and was found to have a hemoglobin of 6.5. Patient denies any noted bleeding but reports feeling SOB. Patient is very [place]     Triage Assessment (Adult)       Row Name 04/03/25 1959          Triage Assessment    Airway WDL WDL        Respiratory WDL    Respiratory WDL WDL        Skin Circulation/Temperature WDL    Skin Circulation/Temperature WDL WDL        Cardiac WDL    Cardiac WDL WDL        Peripheral/Neurovascular WDL    Peripheral Neurovascular WDL WDL        Cognitive/Neuro/Behavioral WDL    Cognitive/Neuro/Behavioral WDL WDL

## (undated) DEVICE — GLOVE BIOGEL PI SZ 7.0 40870

## (undated) DEVICE — LINEN FULL SHEET 5511

## (undated) DEVICE — LINEN HALF SHEET 5512

## (undated) DEVICE — BAG CLEAR TRASH 1.3M 39X33" P4040C

## (undated) DEVICE — TUBING IRRIG CYSTO/BLADDER SET 81" LF 2C4040

## (undated) DEVICE — GUIDEWIRE SENSOR DUAL FLEX STR 0.035"X150CM M0066703080

## (undated) DEVICE — CATH URETERAL TIGERTAIL 05FR 70CM 139005

## (undated) DEVICE — BAG URINARY DRAIN 4000ML LF 153509

## (undated) DEVICE — PREP TECHNI-CARE CHLOROXYLENOL 3% 4OZ BOTTLE C222-4ZWO

## (undated) DEVICE — GLOVE BIOGEL PI ULTRATOUCH SZ 6.0 41160

## (undated) DEVICE — PACK CYSTO CUSTOM RIDGES

## (undated) DEVICE — CATH FOLEY COUNCIL 16FR 5ML LATEX 0196L16

## (undated) DEVICE — SOL WATER IRRIG 3000ML BAG 2B7117

## (undated) DEVICE — GLOVE BIOGEL PI SZ 6.5 40865

## (undated) DEVICE — SOL WATER IRRIG 1000ML BOTTLE 2F7114

## (undated) RX ORDER — CEFAZOLIN SODIUM/WATER 2 G/20 ML
SYRINGE (ML) INTRAVENOUS
Status: DISPENSED
Start: 2024-06-08

## (undated) RX ORDER — FENTANYL CITRATE 50 UG/ML
INJECTION, SOLUTION INTRAMUSCULAR; INTRAVENOUS
Status: DISPENSED
Start: 2024-06-08